# Patient Record
Sex: MALE | Race: WHITE | NOT HISPANIC OR LATINO
[De-identification: names, ages, dates, MRNs, and addresses within clinical notes are randomized per-mention and may not be internally consistent; named-entity substitution may affect disease eponyms.]

---

## 2021-09-23 PROBLEM — Z00.00 ENCOUNTER FOR PREVENTIVE HEALTH EXAMINATION: Status: ACTIVE | Noted: 2021-09-23

## 2021-09-27 ENCOUNTER — NON-APPOINTMENT (OUTPATIENT)
Age: 66
End: 2021-09-27

## 2021-09-27 ENCOUNTER — APPOINTMENT (OUTPATIENT)
Dept: CARDIOLOGY | Facility: CLINIC | Age: 66
End: 2021-09-27
Payer: MEDICARE

## 2021-09-27 VITALS
HEIGHT: 71 IN | TEMPERATURE: 97.8 F | SYSTOLIC BLOOD PRESSURE: 114 MMHG | DIASTOLIC BLOOD PRESSURE: 72 MMHG | WEIGHT: 169 LBS | HEART RATE: 58 BPM | BODY MASS INDEX: 23.66 KG/M2 | OXYGEN SATURATION: 98 %

## 2021-09-27 VITALS — SYSTOLIC BLOOD PRESSURE: 120 MMHG | DIASTOLIC BLOOD PRESSURE: 74 MMHG

## 2021-09-27 DIAGNOSIS — Z86.39 PERSONAL HISTORY OF OTHER ENDOCRINE, NUTRITIONAL AND METABOLIC DISEASE: ICD-10-CM

## 2021-09-27 DIAGNOSIS — Z87.891 PERSONAL HISTORY OF NICOTINE DEPENDENCE: ICD-10-CM

## 2021-09-27 DIAGNOSIS — Z82.49 FAMILY HISTORY OF ISCHEMIC HEART DISEASE AND OTHER DISEASES OF THE CIRCULATORY SYSTEM: ICD-10-CM

## 2021-09-27 DIAGNOSIS — Z78.9 OTHER SPECIFIED HEALTH STATUS: ICD-10-CM

## 2021-09-27 DIAGNOSIS — Z82.3 FAMILY HISTORY OF STROKE: ICD-10-CM

## 2021-09-27 PROCEDURE — 99204 OFFICE O/P NEW MOD 45 MIN: CPT

## 2021-09-27 PROCEDURE — 93000 ELECTROCARDIOGRAM COMPLETE: CPT

## 2021-09-27 RX ORDER — UBIDECARENONE 30 MG
81 CAPSULE ORAL DAILY
Refills: 0 | Status: ACTIVE | COMMUNITY

## 2021-09-27 RX ORDER — EZETIMIBE 10 MG/1
10 TABLET ORAL
Qty: 90 | Refills: 3 | Status: ACTIVE | COMMUNITY

## 2021-09-27 RX ORDER — METOPROLOL SUCCINATE 50 MG/1
50 TABLET, EXTENDED RELEASE ORAL DAILY
Qty: 90 | Refills: 1 | Status: ACTIVE | COMMUNITY

## 2021-09-27 RX ORDER — LEVOTHYROXINE SODIUM 0.07 MG/1
75 TABLET ORAL DAILY
Refills: 0 | Status: ACTIVE | COMMUNITY

## 2021-09-27 RX ORDER — ATORVASTATIN CALCIUM 80 MG/1
80 TABLET, FILM COATED ORAL
Qty: 90 | Refills: 3 | Status: ACTIVE | COMMUNITY

## 2021-09-28 NOTE — HISTORY OF PRESENT ILLNESS
[FreeTextEntry1] : CIRO WHARTON  is a 66 year old  M\par \par Initial cardiovascular visit, transitioning care from [place] [facility] \par Previously followed by Dr. Blake \par Primary physician is Dr. Leung who referred\par \par There is a history of coronary artery disease '13\par Initially presented with symptoms of chest discomfort for hours while cutting hedges\par Transferred to [facility]\par ? STEMI but non obsx CAD and resulting LV dysfunction\par Started on med rx, compliant\par h/o HL, past tob, hypothyroid\par There is no prior history of a coronary revascularization. \par There is no history of symptomatic congestive heart failure rheumatic heart disease \par There is no history of symptomatic arrhythmias including atrial fibrillation.\par \par there are no further symptoms \par There is no exertional chest pain, pressure or discomfort. \par There is no significant dyspnea on exertion or orthopnea. 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

## 2021-09-28 NOTE — ASSESSMENT
[FreeTextEntry1] : coronary artery disease prior myocardial infarction \par cardiomyopathy \par cardiac risk factors include hyperlipidemia and tobacco use\par abnormal EKG \par \par recommend echocardiogram stress test and ultrasound imaging \par medical therapy includes aspirin, lipid-lowering therapy with high intensity statin therapy, beta-blocker and ACE inhibitor \par outside records have been requested from [place] \par  \par

## 2021-11-01 ENCOUNTER — APPOINTMENT (OUTPATIENT)
Dept: CARDIOLOGY | Facility: CLINIC | Age: 66
End: 2021-11-01
Payer: MEDICARE

## 2021-11-01 PROCEDURE — 93880 EXTRACRANIAL BILAT STUDY: CPT

## 2021-11-01 PROCEDURE — 93306 TTE W/DOPPLER COMPLETE: CPT

## 2021-11-01 PROCEDURE — 93979 VASCULAR STUDY: CPT

## 2021-11-09 ENCOUNTER — APPOINTMENT (OUTPATIENT)
Dept: CARDIOLOGY | Facility: CLINIC | Age: 66
End: 2021-11-09
Payer: MEDICARE

## 2021-11-09 PROCEDURE — 78452 HT MUSCLE IMAGE SPECT MULT: CPT

## 2021-11-09 PROCEDURE — 93015 CV STRESS TEST SUPVJ I&R: CPT

## 2021-11-09 PROCEDURE — A9502: CPT

## 2021-11-15 ENCOUNTER — APPOINTMENT (OUTPATIENT)
Dept: CARDIOLOGY | Facility: CLINIC | Age: 66
End: 2021-11-15
Payer: MEDICARE

## 2021-11-15 VITALS
HEIGHT: 71 IN | DIASTOLIC BLOOD PRESSURE: 64 MMHG | TEMPERATURE: 97.5 F | SYSTOLIC BLOOD PRESSURE: 110 MMHG | OXYGEN SATURATION: 95 % | WEIGHT: 173 LBS | HEART RATE: 54 BPM | BODY MASS INDEX: 24.22 KG/M2

## 2021-11-15 PROCEDURE — 99214 OFFICE O/P EST MOD 30 MIN: CPT

## 2021-11-22 ENCOUNTER — APPOINTMENT (OUTPATIENT)
Dept: CARDIOLOGY | Facility: CLINIC | Age: 66
End: 2021-11-22
Payer: MEDICARE

## 2021-11-28 NOTE — HISTORY OF PRESENT ILLNESS
[FreeTextEntry1] : CIRO WHARTON  is a 66 year old  M\par \par Transitioning care from [place] [facility] \par Previously followed by Dr. Blake \par \par There is a history of coronary artery disease '13\par Initially presented with symptoms of chest discomfort for hours while cutting hedges\par Transferred to [facility]\par ? STEMI but non obsx CAD and resulting LV dysfunction\par Started on med rx, compliant\par h/o HL, past tob, hypothyroid\par There is no prior history of a coronary revascularization. \par There is no history of symptomatic congestive heart failure rheumatic heart disease \par There is no history of symptomatic arrhythmias including atrial fibrillation.\par \par there are no further symptoms \par There is no exertional chest pain, pressure or discomfort. \par There is no significant dyspnea on exertion or orthopnea. 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[place] \par \par Last office visit noninvasive testing was recommended \par abdominal ultrasound 3.2 cm moderate plaque \par echocardiogram ejection fraction 40% mild mitral regurgitation \par carotid Doppler mild nonobstructive disease \par stress test November 2021 large fixed defects inferior inferolateral LV dysfunction \par blood work October 2021 hemoglobin 15.6 creatinine 1.1 A1c 6.3 total cholesterol 138 LDL 64\par EKG demonstrates sinus bradycardia with old infarct \par prior cardiac catheterization 2013 reported STEMI mild LV dysfunction nonobstructive LAD and obtuse marginal \par prior echocardiogram demonstrates mild LV dysfunction with segmental wall motion abnormalities

## 2021-11-28 NOTE — ASSESSMENT
[FreeTextEntry1] : coronary artery disease prior myocardial infarction \par cardiomyopathy \par cardiac risk factors include hyperlipidemia and tobacco use\par abnormal EKG \par abdominal aneurysm aortic atherosclerosis monitor ao\par abnormal stress test \par discussed further ischemic evaluation \par cardiac catheterization versus cardiac CTA \par he prefers less invasive approach \par monitor aortic dimensions\par medical therapy includes aspirin, lipid-lowering therapy with high intensity statin therapy, beta-blocker and ACE inhibitor

## 2021-12-06 ENCOUNTER — APPOINTMENT (OUTPATIENT)
Dept: CARDIOLOGY | Facility: CLINIC | Age: 66
End: 2021-12-06
Payer: MEDICARE

## 2021-12-06 VITALS
BODY MASS INDEX: 26.6 KG/M2 | SYSTOLIC BLOOD PRESSURE: 108 MMHG | TEMPERATURE: 97.7 F | WEIGHT: 190 LBS | HEART RATE: 58 BPM | HEIGHT: 71 IN | OXYGEN SATURATION: 100 % | DIASTOLIC BLOOD PRESSURE: 68 MMHG

## 2021-12-06 PROCEDURE — 99214 OFFICE O/P EST MOD 30 MIN: CPT

## 2021-12-06 NOTE — HISTORY OF PRESENT ILLNESS
[FreeTextEntry1] : CIRO WHARTON  is a 66 year old  M here to review CT scan\par \par Transitioning care from [place] [facility] \par Previously followed by Dr. Blake \par \par There is a history of coronary artery disease '13\par Initially presented with symptoms of chest discomfort for hours while cutting hedges\par Transferred to [facility]\par ? STEMI but non obsx CAD and resulting LV dysfunction\par Started on med rx, compliant\par h/o HL, past tob, hypothyroid\par \par There is no prior history of a coronary revascularization. \par There is no history of symptomatic congestive heart failure rheumatic heart disease \par There is no history of symptomatic arrhythmias including atrial fibrillation.\par \par there are no further symptoms, walks up to 3 miles\par There is no exertional chest pain, pressure or discomfort. \par There is no significant dyspnea on exertion or orthopnea. 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 \par \par 11/1/21 testing:\par abdominal ultrasound 3.2 cm moderate plaque \par echocardiogram ejection fraction 40% mild mitral regurgitation, (3/2019 EF 52%)\par carotid Doppler mild nonobstructive disease \par stress test November 2021 large fixed defects inferior inferolateral LV dysfunction \par blood work October 2021 hemoglobin 15.6 creatinine 1.1 A1c 6.3 total cholesterol 138 LDL 64\par EKG demonstrates sinus bradycardia with old infarct \par prior cardiac catheterization 2013 reported STEMI mild LV dysfunction nonobstructive LAD and obtuse marginal \par

## 2021-12-06 NOTE — ADDENDUM
[FreeTextEntry1] : Please note the patient was reviewed with NP Pari Aguiar.\par I was physically present during the service of the patient.\par I was directly involved in the management plan and recommendations of the care provided to the patient. \par I personally reviewed the history and physical examination as documented by the NP above.\par Dec  6 2021 12:00PM\par

## 2021-12-06 NOTE — ASSESSMENT
[FreeTextEntry1] : CIRO WHARTON is a 66 year old M who presents today Dec 06, 2021 here to review cardiovascular test results. \par \par Coronary artery disease prior myocardial infarction \par Cardiomyopathy, ischemic\par No angina. Stress w/ large fixed defect no ischemia. \par Reviewed cardiac CT, occluded PDA with collaterals, otherwise nonobstructive disease noted\par Risk factor modification and medical therapy discussed. Cont ASA, statin/zetia, ACE-I, BB. \par Echo with EF 40% - recommend MUGA to clarify, if <40% discussed indication for ICD. \par F/U after testing to discuss. \par \par Cardiac risk factors include hyperlipidemia and past tobacco use\par LDL is <70 at goal, cont lipid lowering rx. \par \par abnormal EKG \par abdominal aneurysm aortic atherosclerosis monitor ao\par monitor aortic dimensions\par \par Medical therapy is limited d/t low blood pressure. Cont ACE-I and BB\par \par Sincerely,\par \par NANCY Persaud\par Patients history, testing, and plan reviewed with supervising MD: Dr. Rajat Sen

## 2022-01-18 ENCOUNTER — APPOINTMENT (OUTPATIENT)
Dept: CARDIOLOGY | Facility: CLINIC | Age: 67
End: 2022-01-18
Payer: MEDICARE

## 2022-01-18 ENCOUNTER — TRANSCRIPTION ENCOUNTER (OUTPATIENT)
Age: 67
End: 2022-01-18

## 2022-01-18 PROCEDURE — A9560: CPT

## 2022-01-18 PROCEDURE — 78472 GATED HEART PLANAR SINGLE: CPT

## 2022-02-08 ENCOUNTER — APPOINTMENT (OUTPATIENT)
Dept: CARDIOLOGY | Facility: CLINIC | Age: 67
End: 2022-02-08
Payer: MEDICARE

## 2022-02-08 VITALS
WEIGHT: 172 LBS | HEART RATE: 62 BPM | OXYGEN SATURATION: 99 % | DIASTOLIC BLOOD PRESSURE: 70 MMHG | BODY MASS INDEX: 24.08 KG/M2 | TEMPERATURE: 97.3 F | HEIGHT: 71 IN | SYSTOLIC BLOOD PRESSURE: 122 MMHG

## 2022-02-08 PROCEDURE — 99214 OFFICE O/P EST MOD 30 MIN: CPT

## 2022-02-08 NOTE — HISTORY OF PRESENT ILLNESS
[FreeTextEntry1] : CIRO WHARTON  is a 66 year old  M \par \par Transitioning care from [place] [facility] \par Previously followed by Dr. Blake \par \par There is a history of coronary artery disease '13\par Initially presented with symptoms of chest discomfort for hours while cutting hedges\par Transferred to [facility]\par ? STEMI but non obsx CAD and resulting LV dysfunction\par Started on med rx, compliant\par h/o HL, past tob, hypothyroid\par \par There is no prior history of a coronary revascularization. \par There is no history of symptomatic congestive heart failure rheumatic heart disease \par There is no history of symptomatic arrhythmias including atrial fibrillation.\par \par there are no further symptoms, walks up to 3 miles\par There is no exertional chest pain, pressure or discomfort. \par There is no significant dyspnea on exertion or orthopnea. 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 \par \par 11/1/21 testing:\par abdominal ultrasound 3.2 cm moderate plaque \par echocardiogram ejection fraction 40% mild mitral regurgitation, (3/2019 EF 52%)\par carotid Doppler mild nonobstructive disease \par stress test November 2021 large fixed defects inferior inferolateral LV dysfunction \par blood work October 2021 hemoglobin 15.6 creatinine 1.1 A1c 6.3 total cholesterol 138 LDL 64\par EKG demonstrates sinus bradycardia with old infarct \par prior cardiac catheterization 2013 reported STEMI mild LV dysfunction nonobstructive LAD and obtuse marginal \par

## 2022-02-08 NOTE — ADDENDUM
[FreeTextEntry1] : Please note the patient was reviewed with NP Pari Aguiar.\payal I was physically present during the service of the patient.\payal I was directly involved in the management plan and recommendations of the care provided to the patient. \payal I personally reviewed the history and physical examination as documented by the NP above.\par Feb 8 2022 10:00AM\payal

## 2022-02-08 NOTE — ASSESSMENT
[FreeTextEntry1] : CIRO WHARTON is a 66 year old M who presents today Feb 08, 2022 with the above history and the following active issues: \par \par Coronary artery disease prior myocardial infarction.\par Cardiomyopathy, ischemic.\par No angina. Stress w/ large fixed defect no ischemia. \par Reviewed cardiac CT, occluded PDA with collaterals, otherwise nonobstructive disease noted.\par Risk factor modification and medical therapy discussed. Cont ASA, statin/zetia, ACE-I, BB. \par Echo with EF 40% - MUGA 43%\par In future if <40% discussed indication for ICD. At this time will cont current medical therapy. \par \par Cardiac risk factors include hyperlipidemia and past tobacco use\par LDL is <70 at goal, cont lipid lowering rx. \par \par abnormal EKG \par abdominal aneurysm aortic atherosclerosis monitor ao\par monitor aortic dimensions\par \par Medical therapy is limited d/t low blood pressure. Cont ACE-I and BB.\par Recent labs from PCP office were requested. \par Continue to monitor renal function and electrolytes.\par Advised to continue low sodium diet and monitor daily weights. \par Discussed exertional symptoms such as decreased exercise tolerance, chest discomfort, or shortness of breath which would warrant sooner evaluation/further investigation. \par Otherwise 6 months followup arranged. \par \par Sincerely,\par \par NANCY Persaud\par Patients history, testing, and plan reviewed with supervising MD: Dr. Rajat Sen

## 2022-08-29 ENCOUNTER — APPOINTMENT (OUTPATIENT)
Dept: CARDIOLOGY | Facility: CLINIC | Age: 67
End: 2022-08-29

## 2022-08-29 VITALS
DIASTOLIC BLOOD PRESSURE: 62 MMHG | BODY MASS INDEX: 23.8 KG/M2 | OXYGEN SATURATION: 97 % | TEMPERATURE: 98 F | WEIGHT: 170 LBS | SYSTOLIC BLOOD PRESSURE: 98 MMHG | HEART RATE: 55 BPM | HEIGHT: 71 IN

## 2022-08-29 PROCEDURE — 99214 OFFICE O/P EST MOD 30 MIN: CPT

## 2022-09-22 ENCOUNTER — APPOINTMENT (OUTPATIENT)
Dept: CARDIOLOGY | Facility: CLINIC | Age: 67
End: 2022-09-22

## 2022-09-22 PROCEDURE — 93306 TTE W/DOPPLER COMPLETE: CPT

## 2022-09-22 PROCEDURE — 93979 VASCULAR STUDY: CPT

## 2022-09-22 NOTE — HISTORY OF PRESENT ILLNESS
[FreeTextEntry1] : CIRO WHARTON  is a 66 year old  M \par \par Transitioning care from [place] [facility] \par Previously followed by Dr. Blake \par \par There is a history of coronary artery disease '13\par Initially presented with symptoms of chest discomfort for hours while cutting hedges\par Transferred to [facility]\par ? STEMI but non obsx CAD and resulting LV dysfunction\par Started on med rx, compliant\par h/o HL, past tob, hypothyroid\par \par There is no prior history of a coronary revascularization. \par There is no history of symptomatic congestive heart failure rheumatic heart disease \par There is no history of symptomatic arrhythmias including atrial fibrillation.\par \par He remains physically active. He walks up to 3 miles.\par There is no exertional chest pain, pressure or discomfort. \par There is no significant dyspnea on exertion or orthopnea. 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 \par MUGA 1/18/22 EF 43%, mild hypokinesis anteroseptal wall\par 12/2/21 Card[address] CT Ca score 268\par <30% LAD stenosis, <40% ramus stenosis, <20% stenosis OM and LCx, RCA <10% stenosis\par Small old infarct posterolateral wall\par PDA occluded with collaterals. \par \par 11/1/21 testing:\par abdominal ultrasound 3.2 cm moderate plaque \par echocardiogram ejection fraction 40% mild mitral regurgitation, (3/2019 EF 52%)\par carotid Doppler mild nonobstructive disease \par stress test November 2021 large fixed defects inferior inferolateral LV dysfunction \par blood work October 2021 hemoglobin 15.6 creatinine 1.1 A1c 6.3 total cholesterol 138 LDL 64\par EKG demonstrates sinus bradycardia with old infarct \par prior cardiac catheterization 2013 reported STEMI mild LV dysfunction nonobstructive LAD and obtuse marginal \par \par

## 2022-09-22 NOTE — ASSESSMENT
[FreeTextEntry1] : Recent blood work has been reviewed. \par Follow-up abdominal ultrasound to monitor aortic dimensions. \par Follow-up echocardiogram to monitor left ventricular function. \par Screen for clinical trial. \par Clinical follow-up will be scheduled at the requested ultrasound imaging.\par \par Coronary artery disease prior myocardial infarction.\par Cardiomyopathy, ischemic.\par No angina. Stress w/ large fixed defect no ischemia. \par Reviewed cardiac CT, occluded PDA with collaterals, otherwise nonobstructive disease noted.\par Risk factor modification and medical therapy discussed. Cont ASA, statin/zetia, ACE-I, BB. \par Echo with EF 40% - MUGA 43%\par In future if <40% discussed indication for ICD. At this time will cont current medical therapy. \par \par Cardiac risk factors include hyperlipidemia and past tobacco use\par LDL is <70 at goal, cont lipid lowering rx. \par \par abnormal EKG \par abdominal aneurysm aortic atherosclerosis monitor ao\par monitor aortic dimensions\par \par Medical therapy is limited d/t low blood pressure. Cont ACE-I and BB.\par Continue to monitor renal function and electrolytes.\par Advised to continue low sodium diet and monitor daily weights. \par Discussed exertional symptoms such as decreased exercise tolerance, chest discomfort, or shortness of breath which would warrant sooner evaluation/further investigation.

## 2022-10-02 ENCOUNTER — RESULT CHARGE (OUTPATIENT)
Age: 67
End: 2022-10-02

## 2022-10-03 ENCOUNTER — APPOINTMENT (OUTPATIENT)
Dept: CARDIOLOGY | Facility: CLINIC | Age: 67
End: 2022-10-03

## 2022-10-03 ENCOUNTER — NON-APPOINTMENT (OUTPATIENT)
Age: 67
End: 2022-10-03

## 2022-10-03 VITALS
HEIGHT: 71 IN | SYSTOLIC BLOOD PRESSURE: 94 MMHG | WEIGHT: 174 LBS | BODY MASS INDEX: 24.36 KG/M2 | TEMPERATURE: 97.3 F | RESPIRATION RATE: 12 BRPM | OXYGEN SATURATION: 96 % | HEART RATE: 54 BPM | DIASTOLIC BLOOD PRESSURE: 60 MMHG

## 2022-10-03 DIAGNOSIS — I34.0 NONRHEUMATIC MITRAL (VALVE) INSUFFICIENCY: ICD-10-CM

## 2022-10-03 DIAGNOSIS — R94.31 ABNORMAL ELECTROCARDIOGRAM [ECG] [EKG]: ICD-10-CM

## 2022-10-03 DIAGNOSIS — I71.4 ABDOMINAL AORTIC ANEURYSM, W/OUT RUPTURE: ICD-10-CM

## 2022-10-03 DIAGNOSIS — I70.0 ATHEROSCLEROSIS OF AORTA: ICD-10-CM

## 2022-10-03 PROCEDURE — 93000 ELECTROCARDIOGRAM COMPLETE: CPT

## 2022-10-03 PROCEDURE — 99214 OFFICE O/P EST MOD 30 MIN: CPT

## 2022-10-03 NOTE — ADDENDUM
[FreeTextEntry1] : Please note the patient was reviewed with NP Pari Aguiar.\par I was physically present during the service of the patient.\par I was directly involved in the management plan and recommendations of the care provided to the patient. \par I personally reviewed the history and physical examination as documented by the NP above.\par \par

## 2022-10-03 NOTE — HISTORY OF PRESENT ILLNESS
[FreeTextEntry1] : CIRO WHARTON  is a 67 year old  M \par \par Transitioning care from [place] [facility] \par Previously followed by Dr. Blake \par \par There is a history of coronary artery disease '13\par Initially presented with symptoms of chest discomfort for hours while cutting hedges\par Transferred to [facility]\par ? STEMI but non obsx CAD and resulting LV dysfunction\par Started on med rx, compliant\par h/o HL, past tob, hypothyroid\par \par There is no prior history of a coronary revascularization. \par There is no history of symptomatic congestive heart failure rheumatic heart disease \par There is no history of symptomatic arrhythmias including atrial fibrillation.\par \par He remains physically active. He walks up to 3 miles.\par There is no exertional chest pain, pressure or discomfort. \par There is no significant dyspnea on exertion or orthopnea. 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 \par MUGA 1/18/22 EF 43%, mild hypokinesis anteroseptal wall\par 12/2/21 Cardiac CT Ca score 268\par <30% LAD stenosis, <40% ramus stenosis, <20% stenosis OM and LCx, RCA <10% stenosis\par Small old infarct posterolateral wall\par PDA occluded with collaterals. \par \par echo 9/2022 EF 50% prior 40%, mild MR, min AI, sergio noted, mild LVE\par abd US 9/2022 abd ao 2.8cm [11/2021  3.2 cm moderate plaque ]\par carotid Doppler 11/2021 mild nonobstructive disease \par stress test November 2021 large fixed defects inferior inferolateral LV dysfunction \par blood work October 2021 hemoglobin 15.6 creatinine 1.1 A1c 6.3 total cholesterol 138 LDL 64\par EKG demonstrates sinus bradycardia with old infarct nonspecific ST-T wave abnormalities \par prior cardiac catheterization 2013 reported STEMI mild LV dysfunction nonobstructive LAD and obtuse marginal \par \par

## 2022-10-03 NOTE — ASSESSMENT
[FreeTextEntry1] : CIRO WHARTON is a 67 year old M who presents today Oct 03, 2022 with the above history and the following active issues: \par \par Coronary artery disease prior myocardial infarction.\par Cardiomyopathy, ischemic.\par No angina. Stress w/ large fixed defect no ischemia. \par Reviewed cardiac CT, occluded PDA with collaterals, otherwise nonobstructive disease noted.\par Risk factor modification and medical therapy discussed. Cont ASA, statin/zetia, ACE-I, BB. \par  MUGA 43%. Reviewed most recent echo now 50% \par In future if <40% discussed indication for ICD. At this time will cont current medical therapy. \par \par Cardiac risk factors include hyperlipidemia and past tobacco use\par LDL is <70 at goal, cont lipid lowering rx. \par \par abnormal EKG \par abdominal aneurysm aortic atherosclerosis monitor ao prior 3.2cm now 2.8cm will perform annually to\par monitor aortic dimensions\par BP/HR very well controlled\par \par Medical therapy is limited d/t low blood pressure. Cont ACE-I and BB.\par Continue to monitor renal function and electrolytes.\par Advised to continue low sodium diet and monitor daily weights. \par Discussed exertional symptoms such as decreased exercise tolerance, chest discomfort, or shortness of breath which would warrant sooner evaluation/further investigation. \par \par Sincerely,\par \par NANCY Persaud\par Patients history, testing, and plan reviewed with supervising MD: Dr. Rajat Sen

## 2023-04-03 ENCOUNTER — APPOINTMENT (OUTPATIENT)
Dept: CARDIOLOGY | Facility: CLINIC | Age: 68
End: 2023-04-03
Payer: MEDICARE

## 2023-04-03 VITALS
BODY MASS INDEX: 24.78 KG/M2 | SYSTOLIC BLOOD PRESSURE: 108 MMHG | WEIGHT: 177 LBS | HEART RATE: 62 BPM | HEIGHT: 71 IN | OXYGEN SATURATION: 98 % | DIASTOLIC BLOOD PRESSURE: 70 MMHG

## 2023-04-03 DIAGNOSIS — I25.2 OLD MYOCARDIAL INFARCTION: ICD-10-CM

## 2023-04-03 PROCEDURE — 99214 OFFICE O/P EST MOD 30 MIN: CPT

## 2023-04-03 RX ORDER — LISINOPRIL 2.5 MG/1
2.5 TABLET ORAL
Qty: 90 | Refills: 0 | Status: ACTIVE | COMMUNITY
Start: 2023-01-03

## 2023-04-03 RX ORDER — LISINOPRIL 5 MG/1
5 TABLET ORAL DAILY
Qty: 45 | Refills: 2 | Status: DISCONTINUED | COMMUNITY
End: 2023-04-03

## 2023-04-07 NOTE — HISTORY OF PRESENT ILLNESS
[FreeTextEntry1] : CIRO WHARTON  is a 67 year old  M \par \par \par There is a history of coronary artery disease '13\par Initially presented with symptoms of chest discomfort for hours while cutting hedges\par Transferred to [facility]\par ? STEMI but non obsx CAD and resulting LV dysfunction\par Started on med rx, compliant\par h/o HL, past tob, hypothyroid\par \par There is no prior history of a coronary revascularization. \par There is no history of symptomatic congestive heart failure rheumatic heart disease \par There is no history of symptomatic arrhythmias including atrial fibrillation.\par \par He remains physically active. He walks up to 3 miles.\par There is no exertional chest pain, pressure or discomfort. \par There is no significant dyspnea on exertion or orthopnea. 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 \par \par echo 9/2022 EF 50% prior 40%, mild MR, min AI, sergio noted, mild LVE\par abd US 9/2022 abd ao 2.8cm [11/2021  3.2 cm moderate plaque ]\par carotid Doppler 11/2021 mild nonobstructive disease \par stress test November 2021 large fixed defects inferior inferolateral LV dysfunction \par blood work October 2021 hemoglobin 15.6 creatinine 1.1 A1c 6.3 total cholesterol 138 LDL 64\par EKG demonstrates sinus bradycardia with old infarct nonspecific ST-T wave abnormalities \par prior cardiac catheterization 2013 reported STEMI mild LV dysfunction nonobstructive LAD and obtuse marginal \par

## 2023-04-07 NOTE — ASSESSMENT
[FreeTextEntry1] : \par continue current medical therapy \par screen for clinical trial\par \par Coronary artery disease prior myocardial infarction.\par Cardiomyopathy, ischemic.\par No angina. Stress w/ large fixed defect no ischemia. \par Reviewed cardiac CT, occluded PDA with collaterals, otherwise nonobstructive disease noted.\par Risk factor modification and medical therapy discussed. Cont ASA, statin/zetia, ACE-I, BB. \par \par Cardiac risk factors include hyperlipidemia and past tobacco use\par cont lipid lowering rx. \par \par abnormal EKG \par abdominal aneurysm aortic atherosclerosis monitor ao prior 3.2cm now 2.8cm will perform annually to\par monitor aortic dimensions\par BP/HR very well controlled\par \par Medical therapy is limited d/t low blood pressure. Cont ACE-I and BB.\par monitor renal function and electrolytes.\par Advised to continue low sodium diet and monitor daily weights. \par Discussed exertional symptoms such as decreased exercise tolerance, chest discomfort, or shortness of breath which would warrant sooner evaluation/further investigation.

## 2023-10-09 ENCOUNTER — APPOINTMENT (OUTPATIENT)
Dept: CARDIOLOGY | Facility: CLINIC | Age: 68
End: 2023-10-09
Payer: MEDICARE

## 2023-10-09 ENCOUNTER — NON-APPOINTMENT (OUTPATIENT)
Age: 68
End: 2023-10-09

## 2023-10-09 VITALS
OXYGEN SATURATION: 96 % | SYSTOLIC BLOOD PRESSURE: 118 MMHG | BODY MASS INDEX: 23.8 KG/M2 | DIASTOLIC BLOOD PRESSURE: 72 MMHG | WEIGHT: 170 LBS | HEIGHT: 71 IN | HEART RATE: 51 BPM

## 2023-10-09 LAB
HBA1C MFR BLD HPLC: 6.6
LDLC SERPL DIRECT ASSAY-MCNC: 66

## 2023-10-09 PROCEDURE — 93306 TTE W/DOPPLER COMPLETE: CPT

## 2023-10-09 PROCEDURE — 93000 ELECTROCARDIOGRAM COMPLETE: CPT

## 2023-10-09 PROCEDURE — 99214 OFFICE O/P EST MOD 30 MIN: CPT

## 2024-04-14 NOTE — HISTORY OF PRESENT ILLNESS
[FreeTextEntry1] : CIRO WHARTON  is a 68 year old  M There is a history of coronary artery disease '13 Initially presented with symptoms of chest discomfort for hours while cutting hedges Transferred to [facility] ? STEMI but non obsx CAD and resulting LV dysfunction Started on med rx, compliant h/o HL, past tob, hypothyroid  There is no prior history of a coronary revascularization.  There is no history of symptomatic congestive heart failure rheumatic heart disease  There is no history of symptomatic arrhythmias including atrial fibrillation.  He remains physically active. He walks up to 3 miles. There is no exertional chest pain, pressure or discomfort.  There is no significant dyspnea on exertion or orthopnea.  There are no symptomatic palpitations, lightheadedness, dizziness or syncope.  BP/HR borderline low asx  He has cut carbs out of his diet.  He has lost 7 pounds.  smoked in the past quit at time of MI  EKG demonstrates sinus bradycardia with poor R wave progression  blood work potassium 4.2 creatinine 1.1 LDL 66 total cholesterol 134 A1c 6.6. Echocardiogram has mild left ventricular dysfunction mild to moderate mitral regurgitation   retired from the [employer] lives full-time in the [facility] 1/18/22 EF 43%, mild hypokinesis anteroseptal wall 12/2/21 Cardiac CT Ca score 268 <30% LAD stenosis, <40% ramus stenosis, <20% stenosis OM and LCx, RCA <10% stenosis Small old infarct posterolateral wall PDA occluded with collaterals.   abd US 9/2022 abd ao 2.8cm [11/2021  3.2 cm moderate plaque ] carotid Doppler 11/2021 mild nonobstructive disease  stress test November 2021 large fixed defects inferior inferolateral LV dysfunction  blood work October 2021 hemoglobin 15.6 creatinine 1.1 A1c 6.3 total cholesterol 138 LDL 64 prior cardiac catheterization 2013 reported STEMI mild LV dysfunction nonobstructive LAD and obtuse marginal

## 2024-04-14 NOTE — ASSESSMENT
[FreeTextEntry1] : Echocardiogram EKG and blood work have been reviewed. monitor LV size function and degree of regurgitation  discussed glucose management if persistently elevated A1c.    Coronary artery disease prior myocardial infarction. Cardiomyopathy, ischemic. No angina.  Stress w/ large fixed defect no ischemia.  Reviewed cardiac CT, occluded PDA with collaterals, otherwise nonobstructive disease noted. Risk factor modification and medical therapy discussed.  Cont ASA, statin/zetia, ACE-I, BB.   Cardiac risk factors include hyperlipidemia and past tobacco use cont lipid lowering rx.   abnormal EKG  abdominal aneurysm aortic atherosclerosis monitor ao prior 3.2cm now 2.8cm  monitor aortic dimensions BP/HR very well controlled  Medical therapy is limited d/t low blood pressure. Cont ACE-I and BB. monitor renal function and electrolytes. Advised to continue low sodium diet and monitor daily weights.  Discussed exertional symptoms such as decreased exercise tolerance, chest discomfort, or shortness of breath which would warrant sooner evaluation/further investigation.

## 2024-04-15 ENCOUNTER — APPOINTMENT (OUTPATIENT)
Dept: CARDIOLOGY | Facility: CLINIC | Age: 69
End: 2024-04-15
Payer: MEDICARE

## 2024-04-15 VITALS
BODY MASS INDEX: 23.24 KG/M2 | SYSTOLIC BLOOD PRESSURE: 118 MMHG | WEIGHT: 166 LBS | HEART RATE: 53 BPM | DIASTOLIC BLOOD PRESSURE: 70 MMHG | HEIGHT: 71 IN | OXYGEN SATURATION: 95 %

## 2024-04-15 DIAGNOSIS — I25.5 ISCHEMIC CARDIOMYOPATHY: ICD-10-CM

## 2024-04-15 DIAGNOSIS — I25.10 ATHEROSCLEROTIC HEART DISEASE OF NATIVE CORONARY ARTERY W/OUT ANGINA PECTORIS: ICD-10-CM

## 2024-04-15 DIAGNOSIS — E78.5 HYPERLIPIDEMIA, UNSPECIFIED: ICD-10-CM

## 2024-04-15 PROCEDURE — 99214 OFFICE O/P EST MOD 30 MIN: CPT

## 2024-04-15 NOTE — HISTORY OF PRESENT ILLNESS
[FreeTextEntry1] : CIRO WHARTON  is a 68 year old  M Blood work February 2024 hemoglobin 16.7 creatinine 1.1 A1c 5.5 total cholesterol 140 LDL 75 TSH 3.3 last blood work reviewed follow-up echocardiogram  There is a history of coronary artery disease '13 Initially presented with symptoms of chest discomfort for hours while cutting hedges Transferred to [facility] ? STEMI but non obsx CAD and resulting LV dysfunction Started on med rx, compliant h/o HL, past tob, hypothyroid  There is no prior history of a coronary revascularization.  There is no history of symptomatic congestive heart failure rheumatic heart disease  There is no history of symptomatic arrhythmias including atrial fibrillation.  He remains physically active. He walks up to 3 miles. There is no exertional chest pain, pressure or discomfort.  There is no significant dyspnea on exertion or orthopnea.  There are no symptomatic palpitations, lightheadedness, dizziness or syncope.  BP/HR borderline low asx  He has cut carbs out of his diet.  He has lost 7 pounds.  smoked in the past quit at time of MI  EKG demonstrates sinus bradycardia with poor R wave progression  blood work potassium 4.2 creatinine 1.1 LDL 66 total cholesterol 134 A1c 6.6. Echocardiogram has mild left ventricular dysfunction mild to moderate mitral regurgitation   retired from the [employer] lives full-time in the [facility] 1/18/22 EF 43%, mild hypokinesis anteroseptal wall 12/2/21 Cardiac CT Ca score 268 <30% LAD stenosis, <40% ramus stenosis, <20% stenosis OM and LCx, RCA <10% stenosis Small old infarct posterolateral wall PDA occluded with collaterals.   abd US 9/2022 abd ao 2.8cm [11/2021  3.2 cm moderate plaque ] carotid Doppler 11/2021 mild nonobstructive disease  stress test November 2021 large fixed defects inferior inferolateral LV dysfunction  blood work October 2021 hemoglobin 15.6 creatinine 1.1 A1c 6.3 total cholesterol 138 LDL 64 prior cardiac catheterization 2013 reported STEMI mild LV dysfunction nonobstructive LAD and obtuse marginal   monitor LV size function and degree of regurgitation  discussed glucose management if persistently elevated A1c.    Coronary artery disease prior myocardial infarction. Cardiomyopathy, ischemic. No angina.  Stress w/ large fixed defect no ischemia.  Reviewed cardiac CT, occluded PDA with collaterals, otherwise nonobstructive disease noted. Risk factor modification and medical therapy discussed.  Cont ASA, statin/zetia, ACE-I, BB.   Cardiac risk factors include hyperlipidemia and past tobacco use cont lipid lowering rx.   abnormal EKG  abdominal aneurysm aortic atherosclerosis monitor ao prior 3.2cm now 2.8cm  monitor aortic dimensions BP/HR very well controlled  Medical therapy is limited d/t low blood pressure. Cont ACE-I and BB. monitor renal function and electrolytes. Advised to continue low sodium diet and monitor daily weights.  Discussed exertional symptoms such as decreased exercise tolerance, chest discomfort, or shortness of breath which would warrant sooner evaluation/further investigation.

## 2024-05-05 LAB — HBA1C MFR BLD HPLC: 5.5

## 2024-10-24 LAB — HBA1C MFR BLD HPLC: 6

## 2024-10-28 ENCOUNTER — NON-APPOINTMENT (OUTPATIENT)
Age: 69
End: 2024-10-28

## 2024-10-28 ENCOUNTER — APPOINTMENT (OUTPATIENT)
Dept: CARDIOLOGY | Facility: CLINIC | Age: 69
End: 2024-10-28
Payer: MEDICARE

## 2024-10-28 VITALS
DIASTOLIC BLOOD PRESSURE: 80 MMHG | SYSTOLIC BLOOD PRESSURE: 138 MMHG | OXYGEN SATURATION: 98 % | WEIGHT: 164 LBS | HEIGHT: 71 IN | BODY MASS INDEX: 22.96 KG/M2 | HEART RATE: 50 BPM

## 2024-10-28 DIAGNOSIS — E78.5 HYPERLIPIDEMIA, UNSPECIFIED: ICD-10-CM

## 2024-10-28 DIAGNOSIS — I71.40 ABDOMINAL AORTIC ANEURYSM, WITHOUT RUPTURE, UNSPECIFIED: ICD-10-CM

## 2024-10-28 DIAGNOSIS — I25.10 ATHEROSCLEROTIC HEART DISEASE OF NATIVE CORONARY ARTERY W/OUT ANGINA PECTORIS: ICD-10-CM

## 2024-10-28 DIAGNOSIS — I25.5 ISCHEMIC CARDIOMYOPATHY: ICD-10-CM

## 2024-10-28 PROCEDURE — 93306 TTE W/DOPPLER COMPLETE: CPT

## 2024-10-28 PROCEDURE — 99214 OFFICE O/P EST MOD 30 MIN: CPT

## 2024-10-28 PROCEDURE — 93000 ELECTROCARDIOGRAM COMPLETE: CPT

## 2025-04-26 LAB — HBA1C MFR BLD HPLC: ABNORMAL

## 2025-04-28 ENCOUNTER — NON-APPOINTMENT (OUTPATIENT)
Age: 70
End: 2025-04-28

## 2025-04-28 ENCOUNTER — APPOINTMENT (OUTPATIENT)
Dept: CARDIOLOGY | Facility: CLINIC | Age: 70
End: 2025-04-28
Payer: MEDICARE

## 2025-04-28 VITALS
DIASTOLIC BLOOD PRESSURE: 78 MMHG | SYSTOLIC BLOOD PRESSURE: 110 MMHG | BODY MASS INDEX: 23.38 KG/M2 | WEIGHT: 167 LBS | OXYGEN SATURATION: 96 % | HEIGHT: 71 IN | HEART RATE: 59 BPM

## 2025-04-28 DIAGNOSIS — R94.31 ABNORMAL ELECTROCARDIOGRAM [ECG] [EKG]: ICD-10-CM

## 2025-04-28 DIAGNOSIS — I25.10 ATHEROSCLEROTIC HEART DISEASE OF NATIVE CORONARY ARTERY W/OUT ANGINA PECTORIS: ICD-10-CM

## 2025-04-28 DIAGNOSIS — I25.5 ISCHEMIC CARDIOMYOPATHY: ICD-10-CM

## 2025-04-28 DIAGNOSIS — E78.5 HYPERLIPIDEMIA, UNSPECIFIED: ICD-10-CM

## 2025-04-28 DIAGNOSIS — I70.0 ATHEROSCLEROSIS OF AORTA: ICD-10-CM

## 2025-04-28 PROCEDURE — 99214 OFFICE O/P EST MOD 30 MIN: CPT
